# Patient Record
Sex: MALE | Race: WHITE | NOT HISPANIC OR LATINO | Employment: OTHER | URBAN - METROPOLITAN AREA
[De-identification: names, ages, dates, MRNs, and addresses within clinical notes are randomized per-mention and may not be internally consistent; named-entity substitution may affect disease eponyms.]

---

## 2019-10-23 ENCOUNTER — OFFICE VISIT (OUTPATIENT)
Dept: CARDIOLOGY | Facility: HOSPITAL | Age: 54
End: 2019-10-23

## 2019-10-23 VITALS
BODY MASS INDEX: 41.75 KG/M2 | HEART RATE: 88 BPM | WEIGHT: 315 LBS | SYSTOLIC BLOOD PRESSURE: 173 MMHG | OXYGEN SATURATION: 95 % | DIASTOLIC BLOOD PRESSURE: 92 MMHG | RESPIRATION RATE: 18 BRPM | HEIGHT: 73 IN | TEMPERATURE: 97.8 F

## 2019-10-23 DIAGNOSIS — R06.02 SHORTNESS OF BREATH: Primary | ICD-10-CM

## 2019-10-23 DIAGNOSIS — E87.70 HYPERVOLEMIA, UNSPECIFIED HYPERVOLEMIA TYPE: ICD-10-CM

## 2019-10-23 DIAGNOSIS — I10 ESSENTIAL HYPERTENSION: ICD-10-CM

## 2019-10-23 PROCEDURE — 99204 OFFICE O/P NEW MOD 45 MIN: CPT | Performed by: NURSE PRACTITIONER

## 2019-10-23 RX ORDER — FUROSEMIDE 40 MG/1
40 TABLET ORAL 2 TIMES DAILY
Qty: 60 TABLET | Refills: 11 | Status: SHIPPED | OUTPATIENT
Start: 2019-10-23

## 2019-10-23 RX ORDER — CARVEDILOL 12.5 MG/1
1 TABLET ORAL 2 TIMES DAILY
COMMUNITY
Start: 2013-04-05

## 2019-10-23 RX ORDER — OMEGA-3-ACID ETHYL ESTERS 1 G/1
2 CAPSULE, LIQUID FILLED ORAL DAILY
COMMUNITY
Start: 2014-09-15

## 2019-10-23 RX ORDER — FENOFIBRIC ACID 135 MG/1
1 CAPSULE, DELAYED RELEASE ORAL DAILY
COMMUNITY
Start: 2019-07-17

## 2019-10-23 NOTE — PROGRESS NOTES
[facility]  [facility]      Encounter Date:10/23/2019     [facility] [id number]  [unfilled]    1965    Shahram Lai MD    Elroy Riggs is a 53 y.o. male.      Subjective:     Chief Complaint:  Establish Care   cad,hypervolemia   HPI 53-year-old male presents to the office today to establish care for his CAD/hypertension/hyperlipidemia/hypervolemia.  Patient reports that he had an MI in January 2019 while living in [place].  Patient reports it was a non-STEMI.  He was not taken to the Cath Lab.  He reports since he moved here he has established with  cardiology [facility] but would like to establish here at [facility].  He states he does not feel like his cardiologist listens to him.  Patient has a history of hypertension, uncontrolled type 2 diabetes, hyperlipidemia, sarcoidosis, obstructive sleep apnea (intolerant to CPAP).  He reports he was diagnosed with sarcoidosis at age 29 and he has been in remission since 2009.  He notes he has been experience symptoms of hypervolemia for the last few months.  He reports he was given Lasix 40 mg daily with good improvement in symptoms.  Reports Lasix was stopped for unknown reasons.  He reports that he was 270 pounds in April and is now 339 pounds.  He notes significant dyspnea with minimal exertion, significant fatigue, abdominal fullness, pedal edema, orthopnea.      Patient Active Problem List   Diagnosis   • BP (high blood pressure)   • Diabetes mellitus type 2, uncontrolled (CMS/HCC)   • HLD (hyperlipidemia)   • Shortness of breath   • Hypervolemia   • Neuropathy due to type 2 diabetes mellitus (CMS/HCC)   • Coronary artery disease involving native coronary artery of native heart without angina pectoris   • Sarcoidosis   • CINDY (obstructive sleep apnea)   • PTSD (post-traumatic stress disorder)       Past Medical History:   Diagnosis Date   • Adenomatous polyps    • Annular tear of lumbar disc    •  Cholelithiasis    • Myofascial pain syndrome    • Spondylosis of lumbar region without myelopathy or radiculopathy    • Melvin disease    • Trochanteric bursitis        History reviewed. No pertinent surgical history.    Family History   Problem Relation Age of Onset   • Multiple sclerosis Mother    • Uterine cancer Mother    • Depression Mother    • Suicidality Mother    • Hypertension Mother    • Hyperlipidemia Mother    • Colon cancer Father    • Stroke Father    • Hypertension Father    • Lung cancer Father    • Hyperlipidemia Father    • Diabetes Sister    • Thyroid disease Sister    • Hyperlipidemia Sister    • Heart disease Other        Social History     Socioeconomic History   • Marital status:      Spouse name: Not on file   • Number of children: Not on file   • Years of education: Not on file   • Highest education level: Not on file   Tobacco Use   • Smoking status: Never Smoker   • Smokeless tobacco: Never Used   Substance and Sexual Activity   • Alcohol use: No     Frequency: Never   • Drug use: No   • Sexual activity: Defer   Social History Narrative    caffeine use: 1-2 serving daily       Allergies   Allergen Reactions   • Zocor  [Simvastatin] Itching         Current Outpatient Medications:   •  candesartan (ATACAND) 16 MG tablet, Take 16 mg by mouth Daily., Disp: , Rfl:   •  carvedilol (COREG) 12.5 MG tablet, Take 1 tablet by mouth 2 (Two) Times a Day., Disp: , Rfl:   •  Cholecalciferol (VITAMIN D3) 125 MCG (5000 UT) tablet dispersible, Take 5,000 Units by mouth., Disp: , Rfl:   •  ezetimibe (ZETIA) 10 MG tablet, Take 10 mg by mouth Daily., Disp: , Rfl:   •  gabapentin (NEURONTIN) 800 MG tablet, Take 800 mg by mouth 4 (Four) Times a Day., Disp: , Rfl:   •  Halobetasol Cr & Lactic Ac Cr (ULTRAVATE X, CREAM,) 0.05 & 10 % kit, Apply  topically., Disp: , Rfl:   •  insulin glargine (LANTUS) 100 UNIT/ML injection, Inject 60 Units under the skin into the appropriate area as directed 2 (Two) Times a  Day., Disp: , Rfl:   •  insulin regular (humuLIN R,novoLIN R) 100 UNIT/ML injection, Inject 30 Units under the skin into the appropriate area as directed 3 (Three) Times a Day Before Meals., Disp: , Rfl:   •  L-Methylfolate (ELFOLATE) 15 MG tablet, Take 15 mg by mouth Daily., Disp: , Rfl:   •  levalbuterol (XOPENEX) 1.25 MG/3ML nebulizer solution, Take 1 ampule by nebulization 2 (Two) Times a Day., Disp: , Rfl:   •  LORazepam (ATIVAN) 2 MG tablet, Take 2 mg by mouth Every 6 (Six) Hours As Needed for Anxiety. 2 tabs q hs and 1 tab 2 times a day, Disp: , Rfl:   •  methocarbamol (ROBAXIN) 750 MG tablet, Take 750 mg by mouth 3 (Three) Times a Day As Needed for Muscle Spasms., Disp: , Rfl:   •  nitroglycerin (NITROSTAT) 0.4 MG SL tablet, Place 0.4 mg under the tongue Every 5 (Five) Minutes As Needed for Chest Pain. Take no more than 3 doses in 15 minutes., Disp: , Rfl:   •  omega-3 acid ethyl esters (LOVAZA) 1 g capsule, Take 2 capsules by mouth Daily., Disp: , Rfl:   •  oxyCODONE-acetaminophen (PERCOCET)  MG per tablet, Take 1 tablet by mouth 4 (Four) Times a Day., Disp: , Rfl:   •  prazosin (MINIPRESS) 2 MG capsule, Take 2 mg by mouth Every Night., Disp: , Rfl:   •  rosuvastatin (CRESTOR) 40 MG tablet, Take 40 mg by mouth Daily., Disp: , Rfl:   •  fenofibric acid (TRILIPIX) 135 MG capsule delayed-release delayed release capsule, Take 1 capsule by mouth Daily., Disp: , Rfl:     •  PROAIR  (90 Base) MCG/ACT inhaler, Inhale 1 puff As Needed., Disp: , Rfl:     The following portions of the patient's history were reviewed today and updated as appropriate: allergies, current medications, past family history, past medical history, past social history, past surgical history and problem list     Review of Systems   Constitution: Positive for malaise/fatigue and weight gain. Negative for chills, decreased appetite, diaphoresis, fever, weakness, night sweats and weight loss.   HENT: Positive for congestion. Negative  "for hearing loss, hoarse voice and nosebleeds.    Eyes: Negative for blurred vision, visual disturbance and visual halos.   Cardiovascular: Positive for dyspnea on exertion, leg swelling, orthopnea and paroxysmal nocturnal dyspnea. Negative for chest pain, claudication, cyanosis, irregular heartbeat, near-syncope, palpitations and syncope.   Respiratory: Positive for cough and snoring. Negative for hemoptysis, shortness of breath, sleep disturbances due to breathing, sputum production and wheezing.    Hematologic/Lymphatic: Negative for bleeding problem. Does not bruise/bleed easily.   Skin: Negative for dry skin, itching and rash.   Musculoskeletal: Positive for muscle cramps and muscle weakness. Negative for arthritis, falls, joint pain, joint swelling and myalgias.   Gastrointestinal: Positive for bloating. Negative for abdominal pain, constipation, diarrhea, flatus, heartburn, hematemesis, hematochezia, melena, nausea and vomiting.   Genitourinary: Positive for frequency and nocturia. Negative for dysuria, hematuria and urgency.   Neurological: Positive for excessive daytime sleepiness, dizziness and loss of balance. Negative for headaches and light-headedness.   Psychiatric/Behavioral: Positive for depression. The patient has insomnia and is nervous/anxious.        Objective:     Vitals:    10/23/19 1436 10/23/19 1440 10/23/19 1441   BP: 177/86 179/85 173/92   BP Location: Right arm Left arm Left arm   Patient Position: Sitting Sitting Standing   Cuff Size: Adult Adult Adult   Pulse: 84  88   Resp: 18     Temp: 97.8 °F (36.6 °C)     TempSrc: Temporal     SpO2: 97%  95%   Weight: (!) 154 kg (339 lb 2 oz)     Height: 185.4 cm (73\")         Physical Exam   Constitutional: He is oriented to person, place, and time. He appears well-developed and well-nourished. He is active and cooperative. No distress.   HENT:   Head: Normocephalic and atraumatic.   Mouth/Throat: Oropharynx is clear and moist.   Eyes: Conjunctivae " and EOM are normal. Pupils are equal, round, and reactive to light.   Neck: Normal range of motion. Neck supple. No JVD present. No tracheal deviation present. No thyromegaly present.   Cardiovascular: Normal rate, regular rhythm, normal heart sounds and intact distal pulses.   Pulmonary/Chest: Effort normal. He has rales.   Abdominal: Bowel sounds are normal. He exhibits distension. There is tenderness.   Musculoskeletal: Normal range of motion. He exhibits edema (2+ pitting edema ).   Neurological: He is alert and oriented to person, place, and time.   Skin: Skin is warm, dry and intact.   Psychiatric: He has a normal mood and affect. His behavior is normal.   Nursing note and vitals reviewed.      Lab and Diagnostic Review:  9/22/2019: Glucose 234, BUN 26, creatinine 1.12, sodium 139, potassium 4.3, chloride 103, carbon dioxide 25, total calcium 9.7, alkaline phosphatase 72, total bilirubin 0.3, total protein 6.5, albumin 3.1, GFR greater than 60 ,WBC 5.42, RBC 4.96, hemoglobin 14.1, hematocrit 43.8, platelets 255, A1c 10.2  10/16/2019: Total cholesterol 122, HDL 35, LDL 34, triglycerides 266  EKG 9/22/2019 normal sinus rhythm at 93 bpm  Chest x-ray 9/6/2019 cardiomegaly, low lung volumes degenerative changes of thoracic spine  CTA cardiac coronary 4/8/2019  Focal nonobstructive mild 25 to 49% stenosis with noncalcified plaque in the proximal RCA    Assessment and Plan:   1. Shortness of breath  Due to hypervolemia  Begin lasix 40 mg bid for next week  - Adult Transthoracic Echo Complete W/ Cont if Necessary Per Protocol; Future    2. Essential hypertension  Elevated today due to volume overload  Will readdress htn when patient is on drier side  - Adult Transthoracic Echo Complete W/ Cont if Necessary Per Protocol; Future    3. Hypervolemia, unspecified hypervolemia type  Begin lasix 40 mg bid for next week  - Adult Transthoracic Echo Complete W/ Cont if Necessary Per Protocol; Future    F/u 1 week       It has  been a pleasure to participate in the care of this patient.  Patient was instructed to call the Heart and Valve Center with any questions, concerns, or worsening symptoms.    *Please note that portions of this note were completed with a voice recognition program. Efforts were made to edit the dictations, but occasionally words are mistranscribed.

## 2019-10-28 PROBLEM — E87.70 HYPERVOLEMIA: Status: ACTIVE | Noted: 2019-10-28

## 2019-10-28 PROBLEM — D86.9 SARCOIDOSIS: Status: ACTIVE | Noted: 2019-10-28

## 2019-10-28 PROBLEM — I10 BP (HIGH BLOOD PRESSURE): Status: ACTIVE | Noted: 2019-10-28

## 2019-10-28 PROBLEM — E78.5 HLD (HYPERLIPIDEMIA): Status: ACTIVE | Noted: 2019-10-28

## 2019-10-28 PROBLEM — I25.10 CORONARY ARTERY DISEASE INVOLVING NATIVE CORONARY ARTERY OF NATIVE HEART WITHOUT ANGINA PECTORIS: Status: ACTIVE | Noted: 2019-10-28

## 2019-10-28 PROBLEM — E11.40 NEUROPATHY DUE TO TYPE 2 DIABETES MELLITUS (HCC): Status: ACTIVE | Noted: 2019-10-28

## 2019-10-28 PROBLEM — G47.33 OSA (OBSTRUCTIVE SLEEP APNEA): Status: ACTIVE | Noted: 2019-10-28

## 2019-10-28 PROBLEM — F43.10 PTSD (POST-TRAUMATIC STRESS DISORDER): Status: ACTIVE | Noted: 2019-10-28

## 2019-10-28 PROBLEM — R06.02 SHORTNESS OF BREATH: Status: ACTIVE | Noted: 2019-10-28

## 2019-10-28 PROBLEM — IMO0002 DIABETES MELLITUS TYPE 2, UNCONTROLLED: Status: ACTIVE | Noted: 2019-10-28

## 2019-10-28 RX ORDER — LORAZEPAM 2 MG/1
2 TABLET ORAL EVERY 6 HOURS PRN
COMMUNITY

## 2019-10-28 RX ORDER — ROSUVASTATIN CALCIUM 40 MG/1
40 TABLET, COATED ORAL DAILY
COMMUNITY

## 2019-10-28 RX ORDER — EZETIMIBE 10 MG/1
10 TABLET ORAL DAILY
COMMUNITY

## 2019-10-28 RX ORDER — OXYCODONE AND ACETAMINOPHEN 10; 325 MG/1; MG/1
1 TABLET ORAL 4 TIMES DAILY
COMMUNITY

## 2019-10-28 RX ORDER — NITROGLYCERIN 0.4 MG/1
0.4 TABLET SUBLINGUAL
COMMUNITY

## 2019-10-28 RX ORDER — INSULIN GLARGINE 100 [IU]/ML
60 INJECTION, SOLUTION SUBCUTANEOUS 2 TIMES DAILY
COMMUNITY

## 2019-10-28 RX ORDER — CANDESARTAN 16 MG/1
16 TABLET ORAL DAILY
COMMUNITY

## 2019-10-28 RX ORDER — LEVOMEFOLATE CALCIUM 15 MG
15 TABLET ORAL DAILY
COMMUNITY

## 2019-10-28 RX ORDER — GABAPENTIN 800 MG/1
800 TABLET ORAL 4 TIMES DAILY
COMMUNITY

## 2019-10-28 RX ORDER — METHOCARBAMOL 750 MG/1
750 TABLET, FILM COATED ORAL 3 TIMES DAILY PRN
COMMUNITY

## 2019-10-28 RX ORDER — PRAZOSIN HYDROCHLORIDE 2 MG/1
2 CAPSULE ORAL NIGHTLY
COMMUNITY

## 2019-10-28 RX ORDER — LEVALBUTEROL INHALATION SOLUTION 1.25 MG/3ML
1 SOLUTION RESPIRATORY (INHALATION) 2 TIMES DAILY
COMMUNITY

## 2019-10-29 ENCOUNTER — HOSPITAL ENCOUNTER (OUTPATIENT)
Dept: CARDIOLOGY | Facility: HOSPITAL | Age: 54
Discharge: HOME | End: 2019-10-29
Admitting: NURSE PRACTITIONER

## 2019-10-29 DIAGNOSIS — I10 ESSENTIAL HYPERTENSION: ICD-10-CM

## 2019-10-29 DIAGNOSIS — R06.02 SHORTNESS OF BREATH: ICD-10-CM

## 2019-10-29 DIAGNOSIS — E87.70 HYPERVOLEMIA, UNSPECIFIED HYPERVOLEMIA TYPE: ICD-10-CM

## 2019-10-29 LAB
BH CV ECHO MEAS - AO ROOT AREA (BSA CORRECTED): 1.2
BH CV ECHO MEAS - AO ROOT AREA: 7.7 CM^2
BH CV ECHO MEAS - AO ROOT DIAM: 3.1 CM
BH CV ECHO MEAS - BSA(HAYCOCK): 2.9 M^2
BH CV ECHO MEAS - BSA: 2.7 M^2
BH CV ECHO MEAS - BZI_BMI: 44.7 KILOGRAMS/M^2
BH CV ECHO MEAS - BZI_METRIC_HEIGHT: 185.4 CM
BH CV ECHO MEAS - BZI_METRIC_WEIGHT: 153.8 KG
BH CV ECHO MEAS - EDV(CUBED): 150.5 ML
BH CV ECHO MEAS - EDV(TEICH): 136.5 ML
BH CV ECHO MEAS - EF(CUBED): 83.7 %
BH CV ECHO MEAS - EF(TEICH): 76.3 %
BH CV ECHO MEAS - ESV(CUBED): 24.5 ML
BH CV ECHO MEAS - ESV(TEICH): 32.4 ML
BH CV ECHO MEAS - FS: 45.4 %
BH CV ECHO MEAS - IVS/LVPW: 0.98
BH CV ECHO MEAS - IVSD: 1.2 CM
BH CV ECHO MEAS - LA DIMENSION: 3.6 CM
BH CV ECHO MEAS - LA/AO: 1.2
BH CV ECHO MEAS - LAD MAJOR: 7.2 CM
BH CV ECHO MEAS - LAT PEAK E' VEL: 7.7 CM/SEC
BH CV ECHO MEAS - LATERAL E/E' RATIO: 15.8
BH CV ECHO MEAS - LV MASS(C)D: 268 GRAMS
BH CV ECHO MEAS - LV MASS(C)DI: 99.5 GRAMS/M^2
BH CV ECHO MEAS - LVIDD: 5.3 CM
BH CV ECHO MEAS - LVIDS: 2.9 CM
BH CV ECHO MEAS - LVPWD: 1.2 CM
BH CV ECHO MEAS - MED PEAK E' VEL: 9.2 CM/SEC
BH CV ECHO MEAS - MEDIAL E/E' RATIO: 13.2
BH CV ECHO MEAS - MV A MAX VEL: 120.4 CM/SEC
BH CV ECHO MEAS - MV DEC SLOPE: 818.4 CM/SEC^2
BH CV ECHO MEAS - MV E MAX VEL: 123.4 CM/SEC
BH CV ECHO MEAS - MV E/A: 1
BH CV ECHO MEAS - MV MAX PG: 8.6 MMHG
BH CV ECHO MEAS - MV MEAN PG: 4.3 MMHG
BH CV ECHO MEAS - MV P1/2T MAX VEL: 150 CM/SEC
BH CV ECHO MEAS - MV P1/2T: 53.7 MSEC
BH CV ECHO MEAS - MV V2 MAX: 146.6 CM/SEC
BH CV ECHO MEAS - MV V2 MEAN: 97.1 CM/SEC
BH CV ECHO MEAS - MV V2 VTI: 27.7 CM
BH CV ECHO MEAS - MVA P1/2T LCG: 1.5 CM^2
BH CV ECHO MEAS - MVA(P1/2T): 4.1 CM^2
BH CV ECHO MEAS - PA ACC SLOPE: 463.1 CM/SEC^2
BH CV ECHO MEAS - PA ACC TIME: 0.2 SEC
BH CV ECHO MEAS - PA PR(ACCEL): -9.7 MMHG
BH CV ECHO MEAS - RV MAX PG: 1.7 MMHG
BH CV ECHO MEAS - RV V1 MAX: 66.1 CM/SEC
BH CV ECHO MEAS - SI(CUBED): 46.8 ML/M^2
BH CV ECHO MEAS - SI(TEICH): 38.7 ML/M^2
BH CV ECHO MEAS - SV(CUBED): 126 ML
BH CV ECHO MEAS - SV(TEICH): 104.1 ML
BH CV ECHO MEAS - TAPSE (>1.6): 3.1 CM2
BH CV ECHO MEASUREMENTS AVERAGE E/E' RATIO: 14.6
BH CV XLRA - RV BASE: 4.2 CM
BH CV XLRA - RV LENGTH: 9.1 CM
BH CV XLRA - RV MID: 2.9 CM
BH CV XLRA - TDI S': 18.9 CM/SEC
LEFT ATRIUM VOLUME INDEX: 19.3 ML/M^2
LEFT ATRIUM VOLUME: 52 ML
LV EF 2D ECHO EST: 60 %
MAXIMAL PREDICTED HEART RATE: 169 BPM
STRESS TARGET HR: 144 BPM

## 2019-10-29 PROCEDURE — 93306 TTE W/DOPPLER COMPLETE: CPT

## 2019-10-29 PROCEDURE — 93306 TTE W/DOPPLER COMPLETE: CPT | Performed by: INTERNAL MEDICINE

## 2019-10-30 ENCOUNTER — HOSPITAL ENCOUNTER (OUTPATIENT)
Dept: CARDIOLOGY | Facility: HOSPITAL | Age: 54
Discharge: HOME | End: 2019-10-30
Admitting: NURSE PRACTITIONER

## 2019-10-30 ENCOUNTER — OFFICE VISIT (OUTPATIENT)
Dept: CARDIOLOGY | Facility: HOSPITAL | Age: 54
End: 2019-10-30

## 2019-10-30 VITALS
DIASTOLIC BLOOD PRESSURE: 67 MMHG | RESPIRATION RATE: 21 BRPM | SYSTOLIC BLOOD PRESSURE: 142 MMHG | OXYGEN SATURATION: 94 % | TEMPERATURE: 98.2 F | HEIGHT: 73 IN | HEART RATE: 86 BPM | BODY MASS INDEX: 41.75 KG/M2 | WEIGHT: 315 LBS

## 2019-10-30 DIAGNOSIS — R06.02 SHORTNESS OF BREATH: ICD-10-CM

## 2019-10-30 DIAGNOSIS — E78.5 HYPERLIPIDEMIA, UNSPECIFIED HYPERLIPIDEMIA TYPE: ICD-10-CM

## 2019-10-30 DIAGNOSIS — E11.65 UNCONTROLLED TYPE 2 DIABETES MELLITUS WITH HYPERGLYCEMIA (HCC): ICD-10-CM

## 2019-10-30 DIAGNOSIS — R00.2 PALPITATIONS: ICD-10-CM

## 2019-10-30 DIAGNOSIS — I25.10 CORONARY ARTERY DISEASE INVOLVING NATIVE CORONARY ARTERY OF NATIVE HEART WITHOUT ANGINA PECTORIS: Primary | ICD-10-CM

## 2019-10-30 DIAGNOSIS — R07.2 PRECORDIAL PAIN: ICD-10-CM

## 2019-10-30 DIAGNOSIS — I10 ESSENTIAL HYPERTENSION: ICD-10-CM

## 2019-10-30 DIAGNOSIS — R00.0 TACHYCARDIA: ICD-10-CM

## 2019-10-30 PROCEDURE — 99214 OFFICE O/P EST MOD 30 MIN: CPT | Performed by: NURSE PRACTITIONER

## 2019-10-30 PROCEDURE — 0296T HC EXT ECG > 48HR TO 21 DAY RCRD W/CONECT INTL RCRD: CPT

## 2019-10-30 RX ORDER — METOLAZONE 2.5 MG/1
2.5 TABLET ORAL DAILY
Qty: 6 TABLET | Refills: 1 | Status: SHIPPED | OUTPATIENT
Start: 2019-10-30

## 2019-10-30 RX ORDER — POTASSIUM CHLORIDE 750 MG/1
10 TABLET, EXTENDED RELEASE ORAL DAILY
Qty: 30 TABLET | Refills: 0 | Status: SHIPPED | OUTPATIENT
Start: 2019-10-30

## 2019-10-30 RX ORDER — FLUTICASONE PROPIONATE 50 MCG
SPRAY, SUSPENSION (ML) NASAL
COMMUNITY
Start: 2019-10-16

## 2019-10-30 RX ORDER — CLONIDINE HYDROCHLORIDE 0.2 MG/1
0.2 TABLET ORAL AS NEEDED
COMMUNITY
Start: 2013-04-05

## 2019-10-30 RX ORDER — FENOFIBRATE 160 MG/1
160 TABLET ORAL DAILY
COMMUNITY
Start: 2014-08-27

## 2019-10-30 RX ORDER — LANCETS 33 GAUGE
EACH MISCELLANEOUS
COMMUNITY
Start: 2019-10-24

## 2019-10-30 RX ORDER — BLOOD-GLUCOSE METER
EACH MISCELLANEOUS
COMMUNITY
Start: 2019-10-24

## 2019-10-30 RX ORDER — LAMOTRIGINE 150 MG/1
150 TABLET ORAL DAILY
COMMUNITY
Start: 2019-08-30

## 2019-10-30 RX ORDER — ICOSAPENT ETHYL 1000 MG/1
1 CAPSULE ORAL DAILY
COMMUNITY
Start: 2019-08-30

## 2019-10-30 RX ORDER — FLURBIPROFEN SODIUM 0.3 MG/ML
SOLUTION/ DROPS OPHTHALMIC
COMMUNITY
Start: 2019-10-24

## 2019-10-30 RX ORDER — IBUPROFEN 800 MG/1
800 TABLET ORAL AS NEEDED
COMMUNITY
Start: 2012-07-12

## 2019-10-30 RX ORDER — CETIRIZINE HYDROCHLORIDE 10 MG/1
10 TABLET ORAL DAILY
COMMUNITY
Start: 2019-10-16

## 2019-10-30 NOTE — PROGRESS NOTES
[facility]  [facility]      Encounter Date:10/30/2019     Elroy Riggs  [address] [place] [id number]  [unfilled]    1965    Shahram Lai MD    Elroy Riggs is a 54 y.o. male.      Subjective:     Chief Complaint:  Follow-up   hypervolemia  HPI patient presents the office today for ongoing evaluation of his hypervolemia.  He was seen in the office last week and was started on Lasix 40 mg twice daily.  He reports increased urination but notes very little improvement in symptoms.  He has had a 2 pound weight loss since last week.  Had his echo yesterday which showed an EF of 60% no valvular disease.  He reports that he has been experiencing significant stabbing pain in the left chest that worsens with exertion and improves with rest.  Patient had a history of an MI January 2019, data deficient.  He also reports significant ongoing dyspnea with minimal exertion, significant pedal edema.  Notes compliance with his medications.  He reports that his baseline dry weight is 270 pounds.  He recently had a renal artery duplex, renal ultrasound and an abdominal ultrasound, results pending at [facility]. He reports that he is very fatigued and falls asleep as soon as he sits down. Notes heart is racing at rest with associated dyspnea, fatigue.       Patient Active Problem List   Diagnosis   • BP (high blood pressure)   • Diabetes mellitus type 2, uncontrolled (CMS/HCC)   • HLD (hyperlipidemia)   • Shortness of breath   • Hypervolemia   • Neuropathy due to type 2 diabetes mellitus (CMS/HCC)   • Coronary artery disease involving native coronary artery of native heart without angina pectoris   • Sarcoidosis   • CINDY (obstructive sleep apnea)   • PTSD (post-traumatic stress disorder)       Past Medical History:   Diagnosis Date   • Adenomatous polyps    • Annular tear of lumbar disc    • Cholelithiasis    • Myofascial pain syndrome    • Spondylosis of lumbar region without  myelopathy or radiculopathy    • Anthony disease    • Trochanteric bursitis        History reviewed. No pertinent surgical history.    Family History   Problem Relation Age of Onset   • Multiple sclerosis Mother    • Uterine cancer Mother    • Depression Mother    • Suicidality Mother    • Hypertension Mother    • Hyperlipidemia Mother    • Colon cancer Father    • Stroke Father    • Hypertension Father    • Lung cancer Father    • Hyperlipidemia Father    • Diabetes Sister    • Thyroid disease Sister    • Hyperlipidemia Sister    • Heart disease Other        Social History     Socioeconomic History   • Marital status:      Spouse name: Not on file   • Number of children: Not on file   • Years of education: Not on file   • Highest education level: Not on file   Tobacco Use   • Smoking status: Never Smoker   • Smokeless tobacco: Never Used   Substance and Sexual Activity   • Alcohol use: No     Frequency: Never   • Drug use: No   • Sexual activity: Defer   Social History Narrative    caffeine use: 1-2 serving daily       Allergies   Allergen Reactions   • Zocor  [Simvastatin] Itching         Current Outpatient Medications:   •  B-D UF III MINI PEN NEEDLES 31G X 5 MM misc, , Disp: , Rfl:   •  Blood Glucose Monitoring Suppl (ONE TOUCH ULTRA 2) w/Device kit, , Disp: , Rfl:   •  candesartan (ATACAND) 16 MG tablet, Take 16 mg by mouth Daily., Disp: , Rfl:   •  carvedilol (COREG) 12.5 MG tablet, Take 1 tablet by mouth 2 (Two) Times a Day., Disp: , Rfl:   •  cetirizine (zyrTEC) 10 MG tablet, Take 10 mg by mouth Daily., Disp: , Rfl:   •  Cholecalciferol (VITAMIN D3) 125 MCG (5000 UT) tablet dispersible, Take 5,000 Units by mouth., Disp: , Rfl:   •  cloNIDine (CATAPRES) 0.2 MG tablet, Take 0.2 mg by mouth As Needed., Disp: , Rfl:   •  ezetimibe (ZETIA) 10 MG tablet, Take 10 mg by mouth Daily., Disp: , Rfl:   •  fenofibrate 160 MG tablet, Take 160 mg by mouth Daily., Disp: , Rfl:   •  fenofibric acid (TRILIPIX) 135 MG  capsule delayed-release delayed release capsule, Take 1 capsule by mouth Daily., Disp: , Rfl:   •  fluticasone (FLONASE) 50 MCG/ACT nasal spray, , Disp: , Rfl:   •  furosemide (LASIX) 40 MG tablet, Take 1 tablet by mouth 2 (Two) Times a Day., Disp: 60 tablet, Rfl: 11  •  gabapentin (NEURONTIN) 800 MG tablet, Take 800 mg by mouth 4 (Four) Times a Day., Disp: , Rfl:   •  Halobetasol Cr & Lactic Ac Cr (ULTRAVATE X, CREAM,) 0.05 & 10 % kit, Apply  topically., Disp: , Rfl:   •  ibuprofen (ADVIL,MOTRIN) 800 MG tablet, Take 800 mg by mouth As Needed., Disp: , Rfl:   •  insulin glargine (LANTUS) 100 UNIT/ML injection, Inject 60 Units under the skin into the appropriate area as directed 2 (Two) Times a Day., Disp: , Rfl:   •  insulin regular (humuLIN R,novoLIN R) 100 UNIT/ML injection, Inject 30 Units under the skin into the appropriate area as directed 3 (Three) Times a Day Before Meals., Disp: , Rfl:   •  L-Methylfolate (ELFOLATE) 15 MG tablet, Take 15 mg by mouth Daily., Disp: , Rfl:   •  Lancets (ONETOUCH DELICA PLUS [id number]) misc, , Disp: , Rfl:   •  levalbuterol (XOPENEX) 1.25 MG/3ML nebulizer solution, Take 1 ampule by nebulization 2 (Two) Times a Day., Disp: , Rfl:   •  LORazepam (ATIVAN) 2 MG tablet, Take 2 mg by mouth Every 6 (Six) Hours As Needed for Anxiety. 2 tabs q hs and 1 tab 2 times a day, Disp: , Rfl:   •  methocarbamol (ROBAXIN) 750 MG tablet, Take 750 mg by mouth 3 (Three) Times a Day As Needed for Muscle Spasms., Disp: , Rfl:   •  nitroglycerin (NITROSTAT) 0.4 MG SL tablet, Place 0.4 mg under the tongue Every 5 (Five) Minutes As Needed for Chest Pain. Take no more than 3 doses in 15 minutes., Disp: , Rfl:   •  omega-3 acid ethyl esters (LOVAZA) 1 g capsule, Take 2 capsules by mouth Daily., Disp: , Rfl:   •  ONE TOUCH ULTRA TEST test strip, , Disp: , Rfl:   •  oxyCODONE-acetaminophen (PERCOCET)  MG per tablet, Take 1 tablet by mouth 4 (Four) Times a Day., Disp: , Rfl:   •  prazosin (MINIPRESS) 2  MG capsule, Take 2 mg by mouth Every Night., Disp: , Rfl:   •  PROAIR  (90 Base) MCG/ACT inhaler, Inhale 1 puff As Needed., Disp: , Rfl:   •  rosuvastatin (CRESTOR) 40 MG tablet, Take 40 mg by mouth Daily., Disp: , Rfl:   •  SUBVENITE 150 MG tablet, Take 150 mg by mouth Daily., Disp: , Rfl:   •  VASCEPA 1 g capsule capsule, Take 1 capsule by mouth Daily., Disp: , Rfl:   •  metOLazone (ZAROXOLYN) 2.5 MG tablet, Take 1 tablet by mouth Daily., Disp: 6 tablet, Rfl: 1  •  potassium chloride (K-DUR,KLOR-CON) 10 MEQ CR tablet, Take 1 tablet by mouth Daily., Disp: 30 tablet, Rfl: 0    The following portions of the patient's history were reviewed today and updated as appropriate: allergies, current medications, past family history, past medical history, past social history, past surgical history and problem list     Review of Systems   Constitution: Positive for malaise/fatigue. Negative for chills, decreased appetite, diaphoresis, fever, weakness, night sweats, weight gain and weight loss.   HENT: Negative for congestion, hearing loss, hoarse voice and nosebleeds.    Eyes: Negative for blurred vision, visual disturbance and visual halos.   Cardiovascular: Positive for chest pain, dyspnea on exertion, leg swelling, orthopnea and palpitations. Negative for claudication, cyanosis, irregular heartbeat, near-syncope, paroxysmal nocturnal dyspnea and syncope.   Respiratory: Positive for cough and shortness of breath. Negative for hemoptysis, sleep disturbances due to breathing, snoring, sputum production and wheezing.    Hematologic/Lymphatic: Negative for bleeding problem. Does not bruise/bleed easily.   Skin: Negative for dry skin, itching and rash.   Musculoskeletal: Negative for arthritis, joint pain, joint swelling and myalgias.   Gastrointestinal: Positive for nausea. Negative for bloating, abdominal pain, constipation, diarrhea, flatus, heartburn, hematemesis, hematochezia, melena and vomiting.   Genitourinary:  "Positive for frequency. Negative for dysuria, hematuria, nocturia and urgency.   Neurological: Positive for excessive daytime sleepiness. Negative for dizziness, headaches, light-headedness and loss of balance.   Psychiatric/Behavioral: Negative for depression. The patient does not have insomnia and is not nervous/anxious.        Objective:     Vitals:    10/30/19 1443   BP: 142/67   BP Location: Left arm   Patient Position: Sitting   Cuff Size: Adult   Pulse: 86   Resp: 21   Temp: 98.2 °F (36.8 °C)   SpO2: 94%   Weight: (!) 153 kg (337 lb 6 oz)   Height: 185.4 cm (73\")       Physical Exam   Constitutional: He is oriented to person, place, and time. He appears well-developed and well-nourished. He is active and cooperative. No distress.   HENT:   Head: Normocephalic and atraumatic.   Mouth/Throat: Oropharynx is clear and moist.   Eyes: Conjunctivae and EOM are normal. Pupils are equal, round, and reactive to light.   Neck: Normal range of motion. Neck supple. No JVD present. No tracheal deviation present. No thyromegaly present.   Cardiovascular: Normal rate, regular rhythm, normal heart sounds and intact distal pulses.   Pulmonary/Chest: Effort normal. He has rales.   Abdominal: Soft. Bowel sounds are normal. He exhibits no distension. There is no tenderness.   Musculoskeletal: Normal range of motion. He exhibits edema (2+ pitting edema noted BLES).   Neurological: He is alert and oriented to person, place, and time.   Skin: Skin is warm, dry and intact.   Psychiatric: He has a normal mood and affect. His behavior is normal.   Nursing note and vitals reviewed.      Lab and Diagnostic Review:    · Echo: Estimated EF = 60%.  Left ventricular systolic function is normal.  9/22/2019: Glucose 234, BUN 26, creatinine 1.12, sodium 139, potassium 4.3, chloride 103, carbon dioxide 25, total calcium 9.7, alkaline phosphatase 72, total bilirubin 0.3, total protein 6.5, albumin 3.1, GFR greater than 60 ,WBC 5.42, RBC 4.96, " hemoglobin 14.1, hematocrit 43.8, platelets 255, A1c 10.2  10/16/2019: Total cholesterol 122, HDL 35, LDL 34, triglycerides 266  EKG 9/22/2019 normal sinus rhythm at 93 bpm  Chest x-ray 9/6/2019 cardiomegaly, low lung volumes degenerative changes of thoracic spine  CTA cardiac coronary 4/8/2019  Focal nonobstructive mild 25 to 49% stenosis with noncalcified plaque in the proximal RCA  Assessment and Plan:   1. Coronary artery disease involving native coronary artery of native heart without angina pectoris  sherita score 1  - Stress Test With Pet Myocardial Perfusion (Multi Study); Future    2. Precordial pain    - Stress Test With Pet Myocardial Perfusion (Multi Study); Future    3. Hyperlipidemia, unspecified hyperlipidemia type  Continue zetia, fenofibrate,vascepa  - Stress Test With Pet Myocardial Perfusion (Multi Study); Future    4. Uncontrolled type 2 diabetes mellitus with hyperglycemia (CMS/[facility])    - Stress Test With Pet Myocardial Perfusion (Multi Study); Future    5. Essential hypertension  Well controlled   HTN Education provided today including signs and symptoms, medication management, daily blood pressure monitoring. Patient encouraged to call the Heart and Valve center with any abnormal readings.   - Stress Test With Pet Myocardial Perfusion (Multi Study); Future    6. Shortness of breath  Still showing s/s of fluid overload  Will begin metolazone 2.5 mg for next 4 days  Continue lasix  - Stress Test With Pet Myocardial Perfusion (Multi Study); Future    7. Palpitations    - Holter Monitor - 72 Hour Up To 21 Days; Future    8. Tachycardia    - Holter Monitor - 72 Hour Up To 21 Days; Future          It has been a pleasure to participate in the care of this patient.  Patient was instructed to call the Heart and Valve Center with any questions, concerns, or worsening symptoms.    *Please note that portions of this note were completed with a voice recognition program. Efforts were made to edit the  dictations, but occasionally words are mistranscribed.    [facility] Heart Monitor Documentation    Elroy Stallings Jr Keely  1965  [id number]  10/30/19    RACHEL Martin    [] ZIO XT Patch  Model [id number] Prescribed for N/A Days    · Serial Number: (N + 9 Digits) N   · Apply-By Date on Box:   · USPS Tracking Number:   · USPS Tracking        [] [employer] MINI PLUS Mobile Cardiac Telemetry  Model BGMINIPLUS Prescribed for N/A Days    · Serial Number: (BGM + 7 Digits) BGM  · Shipped-By Date on Box:   · UPS Tracking Number: [id number]  · UPS Tracking      [x] [employer]ian MINI Holter Monitor  Model BGMINIEL Prescribed for 14 Days    · Serial Number: (7 Digits) [id number]  · Shipped-By Date on Box: 10/28/19  · UPS Tracking Number: [id number]  · UPS Tracking        This monitor was applied to the patient's chest and checked for proper functioning.  Mr. Elroy Riggs was instructed in the proper use of this monitor.  He was given the opportunity to ask questions and left the office with the device 's instruction manual.    RACHEL Dinh, 3:13 PM, 10/30/19                  [facility] 8.8.2019

## 2019-10-30 NOTE — PATIENT INSTRUCTIONS
Please begin booster pill tomorrow 30 minutes prior to lasix dose and take for the next 4 days   Please take potassium 10 meq daily for next 4 days with booster pill

## 2019-11-04 ENCOUNTER — HOSPITAL ENCOUNTER (OUTPATIENT)
Dept: CARDIOLOGY | Facility: HOSPITAL | Age: 54
Discharge: HOME | End: 2019-11-04
Admitting: NURSE PRACTITIONER

## 2019-11-04 VITALS
HEIGHT: 73 IN | HEART RATE: 85 BPM | BODY MASS INDEX: 41.75 KG/M2 | SYSTOLIC BLOOD PRESSURE: 147 MMHG | DIASTOLIC BLOOD PRESSURE: 78 MMHG | WEIGHT: 315 LBS

## 2019-11-04 DIAGNOSIS — I10 ESSENTIAL HYPERTENSION: ICD-10-CM

## 2019-11-04 DIAGNOSIS — R06.02 SHORTNESS OF BREATH: ICD-10-CM

## 2019-11-04 DIAGNOSIS — E78.5 HYPERLIPIDEMIA, UNSPECIFIED HYPERLIPIDEMIA TYPE: ICD-10-CM

## 2019-11-04 DIAGNOSIS — I25.10 CORONARY ARTERY DISEASE INVOLVING NATIVE CORONARY ARTERY OF NATIVE HEART WITHOUT ANGINA PECTORIS: ICD-10-CM

## 2019-11-04 DIAGNOSIS — R07.2 PRECORDIAL PAIN: ICD-10-CM

## 2019-11-04 DIAGNOSIS — E11.65 UNCONTROLLED TYPE 2 DIABETES MELLITUS WITH HYPERGLYCEMIA (HCC): ICD-10-CM

## 2019-11-04 LAB
BH CV NUCLEAR PRIOR STUDY: 3
BH CV STRESS BP STAGE 1: NORMAL
BH CV STRESS BP STAGE 2: NORMAL
BH CV STRESS BP STAGE 4: NORMAL
BH CV STRESS COMMENTS STAGE 1: NORMAL
BH CV STRESS DOSE REGADENOSON STAGE 1: 0.4
BH CV STRESS DURATION MIN STAGE 1: 1
BH CV STRESS DURATION MIN STAGE 2: 1
BH CV STRESS DURATION MIN STAGE 3: 1
BH CV STRESS DURATION MIN STAGE 4: 1
BH CV STRESS DURATION SEC STAGE 1: 0
BH CV STRESS DURATION SEC STAGE 2: 0
BH CV STRESS DURATION SEC STAGE 3: 0
BH CV STRESS DURATION SEC STAGE 4: 0
BH CV STRESS HR STAGE 1: 91
BH CV STRESS HR STAGE 2: 92
BH CV STRESS HR STAGE 3: 92
BH CV STRESS HR STAGE 4: 93
BH CV STRESS O2 STAGE 1: 91
BH CV STRESS O2 STAGE 2: 93
BH CV STRESS O2 STAGE 4: 90
BH CV STRESS PROTOCOL 1: NORMAL
BH CV STRESS RECOVERY BP: NORMAL MMHG
BH CV STRESS RECOVERY HR: 93 BPM
BH CV STRESS STAGE 1: 1
BH CV STRESS STAGE 2: 2
BH CV STRESS STAGE 3: 3
BH CV STRESS STAGE 4: 4
LV EF NUC BP: 20 %
MAXIMAL PREDICTED HEART RATE: 168 BPM
PERCENT MAX PREDICTED HR: 63.69 %
STRESS BASELINE BP: NORMAL MMHG
STRESS BASELINE HR: 85 BPM
STRESS PERCENT HR: 75 %
STRESS POST ESTIMATED WORKLOAD: 1 METS
STRESS POST EXERCISE DUR MIN: 4 MIN
STRESS POST EXERCISE DUR SEC: 0 SEC
STRESS POST PEAK BP: NORMAL MMHG
STRESS POST PEAK HR: 107 BPM
STRESS TARGET HR: 143 BPM

## 2019-11-04 PROCEDURE — 25010000002 REGADENOSON 0.4 MG/5ML SOLUTION: Performed by: NURSE PRACTITIONER

## 2019-11-04 PROCEDURE — 0 RUBIDIUM CHLORIDE: Performed by: NURSE PRACTITIONER

## 2019-11-04 PROCEDURE — 93017 CV STRESS TEST TRACING ONLY: CPT

## 2019-11-04 PROCEDURE — 93018 CV STRESS TEST I&R ONLY: CPT | Performed by: INTERNAL MEDICINE

## 2019-11-04 PROCEDURE — 78492 MYOCRD IMG PET MLT RST&STRS: CPT | Performed by: INTERNAL MEDICINE

## 2019-11-04 PROCEDURE — 78492 MYOCRD IMG PET MLT RST&STRS: CPT

## 2019-11-04 PROCEDURE — A9555 RB82 RUBIDIUM: HCPCS | Performed by: NURSE PRACTITIONER

## 2019-11-04 RX ADMIN — RUBIDIUM CHLORIDE RB-82 1 DOSE: 150 INJECTION, SOLUTION INTRAVENOUS at 13:05

## 2019-11-04 RX ADMIN — REGADENOSON 0.4 MG: 0.08 INJECTION, SOLUTION INTRAVENOUS at 13:14

## 2019-11-04 RX ADMIN — RUBIDIUM CHLORIDE RB-82 1 DOSE: 150 INJECTION, SOLUTION INTRAVENOUS at 13:15

## 2019-11-05 ENCOUNTER — TELEPHONE (OUTPATIENT)
Dept: CARDIOLOGY | Facility: HOSPITAL | Age: 54
End: 2019-11-05

## 2019-11-05 NOTE — TELEPHONE ENCOUNTER
Reviewed pet with patient. Continue wearing extended holter. Patient to continue lasix 40 mg bid. Patient to see Cardiology after monitor results. Encouraged patient to wear compression socks.

## 2019-11-20 PROCEDURE — 0298T HOLTER MONITOR - 72 HOUR UP TO 21 DAY: CPT | Performed by: INTERNAL MEDICINE

## 2019-12-02 ENCOUNTER — TELEPHONE (OUTPATIENT)
Dept: CARDIOLOGY | Facility: HOSPITAL | Age: 54
End: 2019-12-02

## 2025-04-12 NOTE — TELEPHONE ENCOUNTER
Patient's wife called the office this morning noting that her  passed away on 11/26/2019.  She reports he sustained a fall at [facility] when he fell over a piece of broken soda sidewalk.  She notes he hit his head hard on the concrete.  Patient's wife encouraged him to seek treatment at the ED but patient refused.  She reports they went to bed that evening and he stated if he still felt poorly in the morning he would go to the ED.  She reports that when she woke up in the morning,  he had passed away.  Reviewed extended holter results with wife which showed a 10 beat run of SVt at 0530, patient asymptomatic.   Expressed condolences to patient's wife.    Sepsis, unspecified organism